# Patient Record
Sex: FEMALE | Race: WHITE | Employment: FULL TIME | URBAN - NONMETROPOLITAN AREA
[De-identification: names, ages, dates, MRNs, and addresses within clinical notes are randomized per-mention and may not be internally consistent; named-entity substitution may affect disease eponyms.]

---

## 2017-11-15 ENCOUNTER — OFFICE VISIT (OUTPATIENT)
Dept: PRIMARY CARE CLINIC | Age: 27
End: 2017-11-15
Payer: COMMERCIAL

## 2017-11-15 VITALS
TEMPERATURE: 99.4 F | HEART RATE: 72 BPM | RESPIRATION RATE: 16 BRPM | WEIGHT: 206.2 LBS | BODY MASS INDEX: 29.52 KG/M2 | HEIGHT: 70 IN | SYSTOLIC BLOOD PRESSURE: 126 MMHG | DIASTOLIC BLOOD PRESSURE: 72 MMHG | OXYGEN SATURATION: 99 %

## 2017-11-15 DIAGNOSIS — J01.40 ACUTE PANSINUSITIS, RECURRENCE NOT SPECIFIED: Primary | ICD-10-CM

## 2017-11-15 PROCEDURE — 99213 OFFICE O/P EST LOW 20 MIN: CPT | Performed by: PHYSICIAN ASSISTANT

## 2017-11-15 RX ORDER — LEVONORGESTREL AND ETHINYL ESTRADIOL AND ETHINYL ESTRADIOL 150-30(84)
KIT ORAL
COMMUNITY
Start: 2017-08-14

## 2017-11-15 RX ORDER — AZITHROMYCIN 250 MG/1
TABLET, FILM COATED ORAL
Qty: 1 PACKET | Refills: 0 | Status: SHIPPED | OUTPATIENT
Start: 2017-11-15 | End: 2017-11-25

## 2017-11-15 RX ORDER — FEXOFENADINE HCL 180 MG/1
180 TABLET ORAL DAILY
COMMUNITY

## 2017-11-15 ASSESSMENT — ENCOUNTER SYMPTOMS
SINUS PRESSURE: 1
SORE THROAT: 1
SINUS COMPLAINT: 1
COUGH: 1

## 2017-11-15 NOTE — PROGRESS NOTES
Subjective:      Patient ID: Amber Ball is a 33 y.o. female. Sinus Problem   This is a new problem. The current episode started in the past 7 days. The problem has been gradually worsening since onset. The maximum temperature recorded prior to her arrival was 100.4 - 100.9 F. Associated symptoms include congestion, coughing, ear pain, headaches, sinus pressure and a sore throat. Treatments tried: Nyquil, Ibuprofen. The treatment provided mild relief. Review of Systems   Constitutional: Negative. HENT: Positive for congestion, ear pain, sinus pressure and sore throat. Respiratory: Positive for cough. Cardiovascular: Negative. Neurological: Positive for headaches. Objective:   Physical Exam   Constitutional: She is oriented to person, place, and time. She appears well-developed and well-nourished. HENT:   Head: Normocephalic. Nose: Mucosal edema and rhinorrhea present. Eyes: Pupils are equal, round, and reactive to light. Neck: Neck supple. Cardiovascular: Normal rate and regular rhythm. Pulmonary/Chest: Effort normal and breath sounds normal.   Neurological: She is alert and oriented to person, place, and time. Skin: Skin is warm and dry. Assessment:      1. Acute pansinusitis, recurrence not specified          Plan:      Johanny Farfan. Fluids/Rest.  Tylenol/Motrin. Nasal saline rinses. Supportive care advised. Follow-up PRN/as planned with PCP.

## 2017-11-15 NOTE — PATIENT INSTRUCTIONS
Patient Education        Sinusitis: Care Instructions  Your Care Instructions    Sinusitis is an infection of the lining of the sinus cavities in your head. Sinusitis often follows a cold. It causes pain and pressure in your head and face. In most cases, sinusitis gets better on its own in 1 to 2 weeks. But some mild symptoms may last for several weeks. Sometimes antibiotics are needed. Follow-up care is a key part of your treatment and safety. Be sure to make and go to all appointments, and call your doctor if you are having problems. It's also a good idea to know your test results and keep a list of the medicines you take. How can you care for yourself at home? · Take an over-the-counter pain medicine, such as acetaminophen (Tylenol), ibuprofen (Advil, Motrin), or naproxen (Aleve). Read and follow all instructions on the label. · If the doctor prescribed antibiotics, take them as directed. Do not stop taking them just because you feel better. You need to take the full course of antibiotics. · Be careful when taking over-the-counter cold or flu medicines and Tylenol at the same time. Many of these medicines have acetaminophen, which is Tylenol. Read the labels to make sure that you are not taking more than the recommended dose. Too much acetaminophen (Tylenol) can be harmful. · Breathe warm, moist air from a steamy shower, a hot bath, or a sink filled with hot water. Avoid cold, dry air. Using a humidifier in your home may help. Follow the directions for cleaning the machine. · Use saline (saltwater) nasal washes to help keep your nasal passages open and wash out mucus and bacteria. You can buy saline nose drops at a grocery store or drugstore. Or you can make your own at home by adding 1 teaspoon of salt and 1 teaspoon of baking soda to 2 cups of distilled water. If you make your own, fill a bulb syringe with the solution, insert the tip into your nostril, and squeeze gently. Solitario Fleischer your nose.   · Put a hot, wet towel or a warm gel pack on your face 3 or 4 times a day for 5 to 10 minutes each time. · Try a decongestant nasal spray like oxymetazoline (Afrin). Do not use it for more than 3 days in a row. Using it for more than 3 days can make your congestion worse. When should you call for help? Call your doctor now or seek immediate medical care if:  · You have new or worse swelling or redness in your face or around your eyes. · You have a new or higher fever. Watch closely for changes in your health, and be sure to contact your doctor if:  · You have new or worse facial pain. · The mucus from your nose becomes thicker (like pus) or has new blood in it. · You are not getting better as expected. Where can you learn more? Go to https://[employer]. org and sign in to your natue account. Enter R394 in the [employer] box to learn more about \"Sinusitis: Care Instructions. \"     If you do not have an account, please click on the \"Sign Up Now\" link. Current as of: July 29, 2016  Content Version: 11.3  © 3140-7018 [employer], [facility]. Care instructions adapted under license by [facility] ([facility]). If you have questions about a medical condition or this instruction, always ask your healthcare professional. [address] any warranty or liability for your use of this information.